# Patient Record
Sex: MALE | Race: OTHER | NOT HISPANIC OR LATINO
[De-identification: names, ages, dates, MRNs, and addresses within clinical notes are randomized per-mention and may not be internally consistent; named-entity substitution may affect disease eponyms.]

---

## 2019-07-29 PROBLEM — Z00.00 ENCOUNTER FOR PREVENTIVE HEALTH EXAMINATION: Status: ACTIVE | Noted: 2019-07-29

## 2019-08-16 ENCOUNTER — APPOINTMENT (OUTPATIENT)
Dept: NEUROSURGERY | Facility: CLINIC | Age: 69
End: 2019-08-16
Payer: MEDICARE

## 2019-08-16 VITALS — BODY MASS INDEX: 22.5 KG/M2 | WEIGHT: 140 LBS | HEIGHT: 66 IN

## 2019-08-16 DIAGNOSIS — Z86.018 PERSONAL HISTORY OF OTHER BENIGN NEOPLASM: ICD-10-CM

## 2019-08-16 PROCEDURE — 99214 OFFICE O/P EST MOD 30 MIN: CPT

## 2019-08-19 PROBLEM — Z86.018 HISTORY OF MENINGIOMA: Status: RESOLVED | Noted: 2019-08-19 | Resolved: 2019-08-19

## 2019-08-19 NOTE — HISTORY OF PRESENT ILLNESS
[FreeTextEntry1] : Mr. Lima is status post resection of left occipital meningioma 5 years ago. He continues to have stable right hemianopsia which is chronic. He is doing well overall. He denies acute complaints today.

## 2019-08-19 NOTE — ASSESSMENT
[FreeTextEntry1] : He will undergo a repeat MRI of the brain with/without gado for surveillance. I will see him back on the MRI is completed.

## 2019-09-06 ENCOUNTER — APPOINTMENT (OUTPATIENT)
Dept: NEUROSURGERY | Facility: CLINIC | Age: 69
End: 2019-09-06
Payer: MEDICARE

## 2019-09-06 DIAGNOSIS — D32.0 BENIGN NEOPLASM OF CEREBRAL MENINGES: ICD-10-CM

## 2019-09-06 PROCEDURE — 99214 OFFICE O/P EST MOD 30 MIN: CPT

## 2019-09-06 NOTE — ASSESSMENT
[FreeTextEntry1] : MRI of brain performed. No new complaints. Some intermittent right ear ringing and occipital pulling sensation.\par \par Neurologically intact except for persistent right hemianopsia. Gait is steady. No hearing impairment. \par \par MRI of brain suggested no signs of recurrent or residual meningioma at all 5 years s/p resection.\par \par ENT consult if right tinnitus becomes persistent or. Will see patient as needed in the future.